# Patient Record
Sex: FEMALE | Race: BLACK OR AFRICAN AMERICAN | URBAN - METROPOLITAN AREA
[De-identification: names, ages, dates, MRNs, and addresses within clinical notes are randomized per-mention and may not be internally consistent; named-entity substitution may affect disease eponyms.]

---

## 2018-11-13 ENCOUNTER — IP HISTORICAL/CONVERTED ENCOUNTER (OUTPATIENT)
Dept: OTHER | Age: 28
End: 2018-11-13

## 2024-11-14 ENCOUNTER — OFFICE VISIT (OUTPATIENT)
Age: 34
End: 2024-11-14
Payer: COMMERCIAL

## 2024-11-14 VITALS
SYSTOLIC BLOOD PRESSURE: 110 MMHG | DIASTOLIC BLOOD PRESSURE: 68 MMHG | BODY MASS INDEX: 28.08 KG/M2 | HEIGHT: 63 IN | WEIGHT: 158.5 LBS

## 2024-11-14 DIAGNOSIS — Z11.3 SCREENING EXAMINATION FOR VENEREAL DISEASE: Primary | ICD-10-CM

## 2024-11-14 DIAGNOSIS — Z12.4 PAP SMEAR FOR CERVICAL CANCER SCREENING: ICD-10-CM

## 2024-11-14 DIAGNOSIS — Z01.419 GYNECOLOGIC EXAM NORMAL: ICD-10-CM

## 2024-11-14 PROCEDURE — 99385 PREV VISIT NEW AGE 18-39: CPT | Performed by: OBSTETRICS & GYNECOLOGY

## 2024-11-14 RX ORDER — PNV NO.95/FERROUS FUM/FOLIC AC 28MG-0.8MG
TABLET ORAL
COMMUNITY
Start: 2024-09-06

## 2024-11-14 RX ORDER — TRETINOIN 0.25 MG/G
CREAM TOPICAL
COMMUNITY
Start: 2024-10-07

## 2024-11-14 ASSESSMENT — ENCOUNTER SYMPTOMS
GASTROINTESTINAL NEGATIVE: 1
RESPIRATORY NEGATIVE: 1

## 2024-11-14 NOTE — PROGRESS NOTES
Evelyn Donohue is a , 34 y.o. female   Patient's last menstrual period was 2024 (exact date).    She presents for her annual    She is having no significant problems.      Menstrual status:  Cycles are usually regular with a 26-32 day interval with 3-7 day duration.    Flow: moderate.      She does not have dysmenorrhea.      Medical conditions:  Since her last annual GYN exam about  ? years ago, she has not the following changes in her health history: none.     Mammogram History:    [facility] ([facility] recent[facility]  [facility]     DEXA Results (most recent):  @[facility]([id number]:1)@       Past Medical History:   Diagnosis Date    Acne     Anemia     Elevated hemoglobin A1c 2024    High cholesterol     Preeclampsia, severe      Past Surgical History:   Procedure Laterality Date    WISDOM TOOTH EXTRACTION         Prior to Admission medications    Medication Sig Start Date End Date Taking? Authorizing Provider   Ferrous Sulfate (IRON) 325 (65 Fe) MG TABS  24  Yes Provider, MD Tru   tretinoin (RETIN-A) 0.025 % cream APPLY A PEA SIZED AMOUNT TO ENTIRE FACE. START EVERY OTHER NIGHT AND INCREASE TO NIGHTLY AS TOLERATED 10/7/24  Yes Provider, MD Tru       No Known Allergies       Tobacco History:  reports that she has never smoked. She has never used smokeless tobacco.  Alcohol use:  has no history on file for alcohol use.  Drug use:  has no history on file for drug use.    Family Medical/Cancer History:   Family History   Problem Relation Age of Onset    Diabetes Maternal Grandmother     High Cholesterol Maternal Grandmother     Diabetes Mother     Thyroid Disease Mother     Hypertension Mother     Diabetes Other         maternal great aunt    Dementia Maternal Great Grandmother     Diabetes Maternal Great Grandmother     High Cholesterol Maternal Great Grandmother         Review of Systems   Constitutional: Negative.    Respiratory: Negative.     Cardiovascular:

## 2024-11-14 NOTE — PROGRESS NOTES
Chief Complaint   Patient presents with    New Patient     Annual exam     /68 (Site: Left Upper Arm, Position: Sitting, Cuff Size: Small Adult)   Ht 1.6 m (5' 3\")   Wt 71.9 kg (158 lb 8 oz)   LMP 11/09/2024 (Exact Date)   BMI 28.08 kg/m²

## 2024-11-19 LAB
., LABCORP: NORMAL
C TRACH RRNA CVX QL NAA+PROBE: NEGATIVE
CYTOLOGIST CVX/VAG CYTO: NORMAL
CYTOLOGY CVX/VAG DOC CYTO: NORMAL
CYTOLOGY CVX/VAG DOC THIN PREP: NORMAL
DX ICD CODE: NORMAL
Lab: NORMAL
N GONORRHOEA RRNA CVX QL NAA+PROBE: NEGATIVE
OTHER STN SPEC: NORMAL
STAT OF ADQ CVX/VAG CYTO-IMP: NORMAL

## 2025-01-22 ENCOUNTER — OFFICE VISIT (OUTPATIENT)
Age: 35
End: 2025-01-22
Payer: COMMERCIAL

## 2025-01-22 VITALS
HEIGHT: 63 IN | WEIGHT: 162.5 LBS | DIASTOLIC BLOOD PRESSURE: 70 MMHG | SYSTOLIC BLOOD PRESSURE: 112 MMHG | BODY MASS INDEX: 28.79 KG/M2

## 2025-01-22 DIAGNOSIS — N89.8 VAGINAL DISCHARGE: Primary | ICD-10-CM

## 2025-01-22 PROCEDURE — 99213 OFFICE O/P EST LOW 20 MIN: CPT | Performed by: OBSTETRICS & GYNECOLOGY

## 2025-01-22 ASSESSMENT — ENCOUNTER SYMPTOMS
GASTROINTESTINAL NEGATIVE: 1
RESPIRATORY NEGATIVE: 1

## 2025-01-22 NOTE — PROGRESS NOTES
Chief Complaint   Patient presents with    Other     While doing a breast exam thought she felt nodules bilaterally. Denies any breast pain. Also irregular periods. Just prior to her cycle she began having a white vaginal discharge/itching so she used monistat. LMP-1-08-25 began to spot & bleeding lasted until 1-18-25     /70 (Site: Right Upper Arm, Position: Sitting, Cuff Size: Small Adult)   Ht 1.6 m (5' 3\")   Wt 73.7 kg (162 lb 8 oz)   LMP 01/08/2025 (Exact Date)   BMI 28.79 kg/m²

## 2025-01-22 NOTE — PROGRESS NOTES
Evelyn Donohue is a , 34 y.o. female   Patient's last menstrual period was 2025 (exact date).    She presents for her problem    She is having  some vaginal d/c, also had some irregular bleeding in 2025, uses condoms, felt areas in breast but since has resolved .      Menstrual status:  Cycles are usually regular with a 26-32 day interval with 3-7 day duration.    Flow: light.      She does not have dysmenorrhea.      Medical conditions:  Since her last annual GYN exam about one year ago, she has not the following changes in her health history: none.     Mammogram History:    LORI Results (most recent):  @[facility]([id number]:1)@     DEXA Results (most recent):  @[facility]([id number]:1)@       Past Medical History:   Diagnosis Date    Acne     Anemia     Elevated hemoglobin A1c 2024    High cholesterol     Preeclampsia, severe      Past Surgical History:   Procedure Laterality Date    WISDOM TOOTH EXTRACTION         Prior to Admission medications    Medication Sig Start Date End Date Taking? Authorizing Provider   Ferrous Sulfate (IRON) 325 (65 Fe) MG TABS  24  Yes ProviderTru MD   tretinoin (RETIN-A) 0.025 % cream APPLY A PEA SIZED AMOUNT TO ENTIRE FACE. START EVERY OTHER NIGHT AND INCREASE TO NIGHTLY AS TOLERATED 10/7/24  Yes ProviderTru MD       No Known Allergies       Tobacco History:  reports that she has never smoked. She has never used smokeless tobacco.  Alcohol use:  has no history on file for alcohol use.  Drug use:  has no history on file for drug use.    Family Medical/Cancer History:   Family History   Problem Relation Age of Onset    Diabetes Maternal Grandmother     High Cholesterol Maternal Grandmother     Diabetes Mother     Thyroid Disease Mother     Hypertension Mother     Diabetes Other         maternal great aunt    Dementia Maternal Great Grandmother     Diabetes Maternal Great Grandmother     High Cholesterol Maternal Great Grandmother

## 2025-01-26 LAB
A VAGINAE DNA VAG QL NAA+PROBE: NORMAL SCORE
BVAB2 DNA VAG QL NAA+PROBE: NORMAL SCORE
C ALBICANS DNA VAG QL NAA+PROBE: NEGATIVE
C GLABRATA DNA VAG QL NAA+PROBE: NEGATIVE
C TRACH DNA SPEC QL NAA+PROBE: NEGATIVE
M GENITALIUM DNA SPEC QL NAA+PROBE: NEGATIVE
M HOMINIS DNA SPEC QL NAA+PROBE: NEGATIVE
MEGA1 DNA VAG QL NAA+PROBE: NORMAL SCORE
N GONORRHOEA DNA VAG QL NAA+PROBE: NEGATIVE
T VAGINALIS DNA VAG QL NAA+PROBE: NEGATIVE
UREAPLASMA DNA SPEC QL NAA+PROBE: NEGATIVE